# Patient Record
(demographics unavailable — no encounter records)

---

## 2019-04-17 NOTE — CT
EXAM:

Abdomen and pelvic CT scan with contrast:





HISTORY:

Right lower quadrant abdominal pain, concern for acute appendicitis



COMPARISON:

None



FINDINGS:



The visualized lung bases are clear.

Liver: Unremarkable.

Gallbladder:Unremarkable.

Pancreas:Unremarkable

Spleen:Unremarkable.

Adrenal glands:Unremarkable.

Kidneys:No renal calculus or acute  obstruction.No solid or cystic mass.

No evidence for bowel obstruction.

Normal-appearing appendix.

The urinary bladder is unremarkable.

No abscess, adenopathy, or abnormal fluid collection within the abdomen or pelvis.



IMPRESSION:

No acute process in the abdomen or pelvis. Normal-appearing appendix.



Reported By: Markell Underwood 

Electronically Signed:  4/17/2019 11:34 PM

## 2019-04-17 NOTE — ULT
Exam:

Pelvic ultrasound including Transabdominal, And Vascular Duplex with color and spectral Doppler  imag
ing:



HISTORY:

Abdominal pain



COMPARISON:

None



FINDINGS:



The uterus is5.8 x 2.6 x 3.8 cm

Endometrial thickness:Within normal limits.

Right ovary:Not visualized

Left ovary:1.5 x 1.8 x 2.7 cm

No abscess or significant abnormal fluid collection.



Vascular duplex examination demonstrates no evidence for ovarian torsion



IMPRESSION:

Nonvisualized right ovary. No other significant acute process.





Reported By: Markell Underwood 

Electronically Signed:  4/17/2019 9:24 PM

## 2019-09-20 NOTE — RAD
Frontal radiograph chest

2 views abdomen:

9/20/2019



COMPARISON: None



HISTORY: Pain



FINDINGS: Lungs are clear. Heart and mediastinal contours are unremarkable. No free intraperitoneal a
ir. Bowel gas pattern appears nonobstructed.



IMPRESSION: No acute findings.



Reported By: Monico Duran 

Electronically Signed:  9/20/2019 5:40 PM